# Patient Record
Sex: MALE | Race: WHITE | Employment: FULL TIME | URBAN - METROPOLITAN AREA
[De-identification: names, ages, dates, MRNs, and addresses within clinical notes are randomized per-mention and may not be internally consistent; named-entity substitution may affect disease eponyms.]

---

## 2021-11-14 ENCOUNTER — OFFICE VISIT (OUTPATIENT)
Dept: URGENT CARE | Facility: URGENT CARE | Age: 26
End: 2021-11-14
Payer: COMMERCIAL

## 2021-11-14 VITALS
TEMPERATURE: 98.2 F | DIASTOLIC BLOOD PRESSURE: 88 MMHG | HEART RATE: 87 BPM | OXYGEN SATURATION: 99 % | SYSTOLIC BLOOD PRESSURE: 135 MMHG

## 2021-11-14 DIAGNOSIS — N50.811 RIGHT TESTICULAR PAIN: Primary | ICD-10-CM

## 2021-11-14 PROCEDURE — 99203 OFFICE O/P NEW LOW 30 MIN: CPT | Performed by: FAMILY MEDICINE

## 2021-11-14 NOTE — LETTER
November 14, 2021      Rishi Souza Patti  [id number] [address] [place] [id number]        To Whom It May Concern:    Rishi Harley Patti  was seen on 11/14.  Please excuse him from work 11/14-11/16 due to illness.        Sincerely,        Cristian Estes MD

## 2021-11-15 NOTE — PROGRESS NOTES
SUBJECTIVE:  Chief Complaint   Patient presents with     Urgent Care     POSSIBLE HERNIA RIGHT BY GENITEILIA PROBLEM SINCE 11 YEARS OLD, THE LAST 2 WEEKS IT HAS BEEN WORSE      Rishi Armstrong is a 26 year old male who presents with a chief complaint of possible hernia.    Right swelling in groin above testicle area since he was 11 years old.  Denies any problem with this until recently.    No trauma, does lift at work but usually no more than 20 pounds.  Endorsed intemittent constipation and diarrhea.  Developed more pain and swelling in location and now more constant pain for the past 2 weeks, worse with touching.  No fever.    Denies any problems with urination.  Had BM today.  Denies any vomiting.    History reviewed. No pertinent past medical history.  No current outpatient medications on file.     Social History     Tobacco Use     Smoking status: Never Smoker     Smokeless tobacco: Never Used   Substance Use Topics     Alcohol use: Not on file       ROS:  Review of systems negative except as stated above.    EXAM:   /88   Pulse 87   Temp 98.2  F (36.8  C)   SpO2 99%   GENERAL APPEARANCE: healthy, alert and no distress  GENITAL: bilateral testes descended, right scrotum with tenderness at superior aspect and more enlarge than left, more prominent swelling in right inguinal area and also tender with palpation.  PSYCH; alert, affect mild anxious      ASSESSMENT/PLAN:  (N50.811) Right testicular pain  (primary encounter diagnosis)  Comment: probable inguinal hernia  Plan: close follow up with PCP      Concerning for hernia in right inguinal location and discussed that is not a location that can be easily reduced and most likely will need surgery.  Discussed ER evaluation vs close follow up with PCP, reviewed that ultrasound is needed for confirmation but this imaging is not within UC capability.  Patient hesitant about ER evaluation due to cost, did verbalized that if developes worsening pain or fever  that will need to be seen in ER.  Okay for tylenol and ibuprofen for now.    Work excuse note given to be off for couple of day  Patient to obtain follow up with PCP for further evaluation.    Cristian Estes MD  November 14, 2021 8:55 PM

## 2021-11-15 NOTE — PATIENT INSTRUCTIONS
Patient Education     Hernia (Adult)    A hernia can happen when there is a weakness or defect in the wall of the abdomen or groin. Intestines or nearby tissues may move from their usual location and push through the weakness in the wall. This can cause a hernia (bulge) you may see or feel.  Causes and risk factors   A hernia may be present at birth. Or it may be caused by the wear and tear of daily living. Certain factors can make a hernia more likely. These can include:    Heavy lifting    Straining, whether from lifting, movement, or constipation    Chronic cough    Injury to the abdominal wall    Excess weight    Pregnancy    Prior surgery    Older age    Family history of hernia  Symptoms  Symptoms of a hernia may come on suddenly. Or they may appear slowly over time. Some common symptoms include:    Bulge in the groin area, around the navel, or in the scrotum (the bulge may get bigger when you stand and go away when you lie down)    Pain or pressure around the bulge    Pain during activities such as lifting, coughing, or sneezing    A feeling of weakness or pressure in the groin    Pain or swelling in the scrotum  Types of hernias  There are different types of hernia. The type you have depends on its location:    Inguinal. This type is in the groin or scrotum. It is more common in men. But, women can get this hernia, too.    Femoral. This type is in the groin, upper thigh (where the leg bends), or labia. It is more common in women.    Ventral. This type is in the abdominal wall.    Umbilical. This type occurs around the navel (belly button).    Incisional. This type occurs at the site of a previous surgery.  The condition of the hernia can help determine how urgently it needs to be treated.    Reducible. It goes back in by itself, or it can be pushed back in.    Irreducible. It can t be pushed back in.    Incarcerated/strangulated. The intestine is trapped (incarcerated). If this happens, you won t be able  to push the bulge back in. If the incarcerated hernia isn t treated, it may become strangulated. This means the area loses blood supply and the tissue may die. This requires emergency surgery. You need treatment right away.  In most cases, a hernia will not heal on its own.You may need surgery to repair the defect in the abdominal wall or groin. You ll be told more about surgery, if needed.  If your symptoms are not severe, treatment may sometimes be delayed. In such cases, you will need regular follow-up visits with the provider. You ll be asked to keep track of your symptoms and to watch for signs of more serious problems. You may also be given guidelines similar to the home care instructions below.  Home care  To help keep a hernia from getting worse, you may be advised to:    Avoid heavy lifting and straining as directed.    Take steps to prevent constipation, such as eating more fiber and drinking more water. This may help reduce straining that can occur when having a bowel movement. Reducing straining may help keep your symptoms from getting worse.    Maintain a healthy weight or lose excess weight. This can help reduce strain on abdominal muscles and tissues.    Stop smoking. This can help prevent coughing that may also strain abdominal muscles and tissues.  Follow-up care  Follow up with your healthcare provider, or as directed. If imaging tests were done, they will be reviewed a doctor. You will be told the results and any new findings that may affect your care.  When to seek medical advice  Call your healthcare provider right away if any of these occur:    Hernia hardens, swells, or grows larger    Hernia can no longer be pushed back in    Pain moves to the lower right abdomen (just below the waistline), or spreads to the back  Call 911  Call 911 if any of these occur:    Severe pain, redness, or tenderness in the area near the hernia    Pain worsens quickly and doesn t get better    Inability to have a  bowel movement or pass gas    Fever of 100.4 F (38 C) or higher, or as directed by your healthcare provider  [employer] last reviewed this educational content on 3/1/2018    8519-7141 [employer]. All rights reserved. This information is not intended as a substitute for professional medical care. Always follow your healthcare professional's instructions.           Patient Education     Testicular Pain, Unclear Cause   You have had pain in one or both testicles. Based on your exam today, the exact cause of your pain is not certain. But your condition doesn't appear to be dangerous. Testicles are very sensitive. Even a small injury can cause quite a bit of pain. Other possible causes of testicular pain include kidney stones, cysts, mumps, inflammatory conditions, chronic conditions, hernia, infection, and a twisted testicle.  Certain tests may be done to rule out an underlying problem causing the pain. Nothing conclusive was found today. Most likely, the pain will go away on its own. If it doesn t, you may need more tests.    Home care  Medicine may be prescribed to help relieve pain and swelling. This may be an over-the-counter pain reliever or prescription pain medicine. Take all medicine as directed.  The following are general care guidelines:    To relieve pain and swelling, apply an ice pack wrapped in a thin towel for 10 minutes at a time. Continue this on and off for 1 to 2 days.    When lying down, place a small rolled towel under your scrotum. When moving around, wear a jockstrap (athletic supporter) or supportive underwear. These will help support and protect your testicles.    If it hurts to walk, walk as little as possible until you feel better.    Don't do any strenuous activity until you feel better.    Don't have sex until you feel better.    If you have severe pain in the testicle, seek care right away. Delay may lead to permanent loss of the testicle s function.  Follow-up care  Follow up  with your healthcare provider, or as advised.  When to seek medical advice  Call your healthcare provider right away if any of these occur:    Fever of 100.4 F (38 C) or higher, or as directed by your provider    Worsening of the pain or severe pain    Swelling of the testicle or scrotum    A lump in the scrotum    Warm and red scrotum (signs of infection)    Nausea and vomiting    Pain or swelling in abdomen    Trouble peeing    Numbness or weakness in the leg    Shrinking of the testicle    Blood in your urine  [employer] last reviewed this educational content on 9/1/2019 2000-2021 [employer]. All rights reserved. This information is not intended as a substitute for professional medical care. Always follow your healthcare professional's instructions.

## 2021-11-16 ENCOUNTER — TELEPHONE (OUTPATIENT)
Dept: SURGERY | Facility: CLINIC | Age: 26
End: 2021-11-16

## 2021-11-16 ENCOUNTER — HOSPITAL ENCOUNTER (OUTPATIENT)
Dept: ULTRASOUND IMAGING | Facility: CLINIC | Age: 26
Discharge: HOME | End: 2021-11-16
Attending: SURGERY | Admitting: SURGERY
Payer: COMMERCIAL

## 2021-11-16 ENCOUNTER — OFFICE VISIT (OUTPATIENT)
Dept: SURGERY | Facility: CLINIC | Age: 26
End: 2021-11-16
Payer: COMMERCIAL

## 2021-11-16 VITALS
WEIGHT: 220 LBS | SYSTOLIC BLOOD PRESSURE: 135 MMHG | RESPIRATION RATE: 16 BRPM | HEART RATE: 80 BPM | OXYGEN SATURATION: 99 % | HEIGHT: 71 IN | DIASTOLIC BLOOD PRESSURE: 80 MMHG | BODY MASS INDEX: 30.8 KG/M2

## 2021-11-16 DIAGNOSIS — N50.811 PAIN IN RIGHT TESTICLE: Primary | ICD-10-CM

## 2021-11-16 DIAGNOSIS — N50.811 PAIN IN RIGHT TESTICLE: ICD-10-CM

## 2021-11-16 PROCEDURE — 76870 US EXAM SCROTUM: CPT

## 2021-11-16 PROCEDURE — 99203 OFFICE O/P NEW LOW 30 MIN: CPT | Performed by: SURGERY

## 2021-11-16 ASSESSMENT — MIFFLIN-ST. JEOR: SCORE: 1995.04

## 2021-11-16 NOTE — PROGRESS NOTES
[facility]  New Patient Office Visit    Assessment:   Rishi Armstrong is a 26 year old male with right groin and testicular pain. Pain near the testicle is out of proportion to exam, much more than I would expect for inguinal hernia pain. I was not able to examine for hernia due to the degree of pain with light touch near the inguinal canal, scrotum and testicle today. He has no alarm symptoms for strangulated inguinal hernia.    Plan:    Sent for urgent ultrasound scrotrum with doppler assess for epididymitis or torsion. Thankfully, that was normal today.  Will obtain a CT abd/pelv with IV contrast to further assess the pelvis, we discussed a large hernia will be apparent, smaller hernias can be missed.   He should establish care with a PCP (does not have one at present)  Discussed if pain gets worse he can present to the ER for more urgent evaluation.                  HPI:  Rishi Armstrong is a 26 year old male who presents for evaluation of pain in the right groin(s).   He first noticed it around age 11. States he hasn't had a bulge     He does have pain and states it feels burning, pressure, sharp and stabbing. He describes exacerbating factors including bending over. The pain goes from the right ASIS down to the testicle.  This has been worse over the past 2 weeks. He was seen at Urgent care 2 days ago. They recommended surgery visit, or ER for more imaging at that time.  He states the pain is very bad but has still been able to work. He states he has a hard time getting work off for medical visits. He is able to move and walk around okay.    Past Medical History:  No past medical history on file.    No current outpatient medications on file.     No current facility-administered medications for this visit.        Past Surgical History:  No past surgical history on file.     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    PE:    Vitals: /80   Pulse 80   Resp 16   Ht 1.803 m (5' 11\")   Wt 99.8 kg (220 lb)   SpO2 99%   BMI 30.68 kg/m    BMI= Body mass index is 30.68 kg/m .  General- mildly obese patient able to get up on table without difficulty. Extremely anxious  Hernia- Upon standing there is prominent soft tissue/excess fat above the pubis and in the regions of both inguinal canals up to the base of the penis and scrotum. Light touch on the right inguinal region skin elicits severe pain so I was unable to assess for hernia.              Testes are descended bilaterally, unable to obtain complete exam due to patient tenderness. Severe tenderness to light palpation around the right testicle.       This note may have been created using voice recognition software. Undetected word substitutions or other errors may have occurred.       30 minutes spent on the date of the encounter doing chart review, history and exam, documentation and further activities as noted above      Sasha Solomon MD  11/16/21 11:26 AM     Please route or send letter to:  *None*      "

## 2021-11-16 NOTE — TELEPHONE ENCOUNTER
Patient was seen for inguinal hernia consult by Dr. Solomon today 11/16/21.     Sent for US today for testicular pain.  He is calling for those results.      I informed patient of normal US results.  He is aware CT of abd/pelvis is ordered to further evaluate for hernia.  Centralized scheduling will be calling him to schedule.   I provided patient with the phone # to centralized scheduling if he does not get a call.      He is wondering about time off work - I advised that time off/ plan has yet to be determined depending on results of CT.      States that pain seems to be abating and wondering if he is ok to go to work - he does not do any heavy lifting or anything strenuous for work.  I advised that if he is feeling up to it he is ok to work.  He will drop off any needed work forms to our office as appropriate.

## 2021-11-18 ENCOUNTER — HOSPITAL ENCOUNTER (OUTPATIENT)
Dept: CT IMAGING | Facility: CLINIC | Age: 26
Discharge: HOME | End: 2021-11-18
Attending: SURGERY | Admitting: SURGERY
Payer: COMMERCIAL

## 2021-11-18 DIAGNOSIS — N50.811 PAIN IN RIGHT TESTICLE: ICD-10-CM

## 2021-11-18 PROCEDURE — 74177 CT ABD & PELVIS W/CONTRAST: CPT

## 2021-11-18 PROCEDURE — 250N000011 HC RX IP 250 OP 636: Performed by: RADIOLOGY

## 2021-11-18 PROCEDURE — 250N000009 HC RX 250: Performed by: RADIOLOGY

## 2021-11-18 RX ORDER — IOPAMIDOL 755 MG/ML
500 INJECTION, SOLUTION INTRAVASCULAR ONCE
Status: COMPLETED | OUTPATIENT
Start: 2021-11-18 | End: 2021-11-18

## 2021-11-18 RX ADMIN — SODIUM CHLORIDE 55 ML: 9 INJECTION, SOLUTION INTRAVENOUS at 12:16

## 2021-11-18 RX ADMIN — IOPAMIDOL 100 ML: 755 INJECTION, SOLUTION INTRAVENOUS at 12:16
